# Patient Record
Sex: MALE | Race: WHITE
[De-identification: names, ages, dates, MRNs, and addresses within clinical notes are randomized per-mention and may not be internally consistent; named-entity substitution may affect disease eponyms.]

---

## 2017-03-18 ENCOUNTER — HOSPITAL ENCOUNTER (EMERGENCY)
Dept: HOSPITAL 59 - ER | Age: 72
Discharge: HOME | End: 2017-03-18
Payer: MEDICARE

## 2017-03-18 DIAGNOSIS — L03.012: Primary | ICD-10-CM

## 2017-03-18 PROCEDURE — 99283 EMERGENCY DEPT VISIT LOW MDM: CPT

## 2017-03-18 NOTE — EMERGENCY DEPARTMENT RECORD
History of Present Illness





- General


Chief complaint: Extremity Problem


Stated complaint: FINGER INJURY ON LT HAND


Time Seen by Provider: 17 17:47


Source: Patient


Mode of Arrival: Ambulatory


Limitations: No limitations





- History of Present Illness


Initial comments: 


pt has a small area on l middle finger that was like a pimple that is getting 

more sore with spreading redness


MD Complaint: Extremity pain, Extremity swelling


Onset/Timin


-: Days(s)


Location: Left, Hand


Severity scale (1-10): 4


Quality: Aching, Other


Consistency: Constant


Improves with: Nothing


Worsens with: Palpation


Associated Symptoms: Denies other symptoms





- Related Data


 Home Medications











 Medication  Instructions  Recorded  Confirmed  Last Taken


 


Aspirin [Aspirin EC] 81 mg PO DAILY 09/04/15 03/18/17 03/18/17


 


Atenolol 100 mg PO DAILY 09/04/15 03/18/17 03/18/17


 


Calcium Carbonate/Vitamin D3 1 each PO DAILY 09/04/15 03/18/17 03/18/17





[Caltrate 600 + D Tablet]    


 


Captopril [Capoten] 100 mg PO DAILY 09/04/15 03/18/17 03/18/17


 


Clonidine HCl 0.1 mg PO DAILY 09/04/15 03/18/17 03/18/17


 


Clopidogrel Bisulfate [Clopidogrel] 75 mg PO DAILY 09/04/15 03/18/17 03/18/17


 


Clotrimazole/Betamethasone Dip 45 gm TP ASDIR PRN 09/04/15 03/18/17 03/18/17





[Clotrimazole-Betamethasone Crm]    


 


Glyburide 5 mg PO BID 09/04/15 03/18/17 03/18/17


 


Insulin Detemir [Levemir Flextouch] 15 unit SQ QHS 09/04/15 03/18/17 03/18/17


 


Isosorbide Mononitrate [Imdur] 60 mg PO DAILY 09/04/15 03/18/17 03/18/17


 


Magnesium Oxide [Mag Ox] 400 mg PO DAILY 09/04/15 03/18/17 03/18/17


 


Metformin HCl [Metformin HCl ER] 1,000 mg PO BID 09/04/15 03/18/17 03/18/17


 


Multivit-Min/FA/Lycopene/Lut 1 each PO DAILY 09/04/15 03/18/17 03/18/17





[Centrum Silver Tablet]    


 


Needles, Insulin Disposable 1 each MC DAILY 09/04/15 03/18/17 03/18/17





[Novofine]    


 


Nitroglycerin [Nitrodur] 1 ea TD ASDIR PRN 09/04/15 03/18/17 Unknown


 


Omega-3/Dha/Epa/Fish Oil [Fish Oil] 500 mg PO DAILY 09/04/15 03/18/17 03/18/17


 


Penicillin V Potassium 500 mg PO DAILY 09/04/15 03/18/17 03/18/17


 


Potassium Chloride [Klor-Con] 10 meq PO BID 09/04/15 03/18/17 03/18/17


 


Pravastatin Sodium [Pravachol] 40 mg PO DAILY 09/04/15 03/18/17 03/18/17








 Previous Rx's











 Medication  Instructions  Recorded


 


Clindamycin HCl [Cleocin HCl] 300 mg PO QID #40 capsule 17











 Allergies











Allergy/AdvReac Type Severity Reaction Status Date / Time


 


No Known Drug Allergies Allergy   Verified 09/04/15 11:52














Travel Screening





- Travel/Exposure Within Last 30 Days


Have you traveled within the last 30 days?: No





- Travel/Exposure Within Last Year


Have you traveled outside the [place] in the last year?: No





- Additonal Travel Details


Have you been exposed to anyone with a communicable illness?: No





- Travel Symptoms


Symptom Screening: None





Review of Systems


Reviewed: No additional complaints except as noted below


Constitutional: Reports: As per HPI.  Denies: Chills, Fever, Malaise, Night 

sweats, Weakness, Weight change


Eyes: Reports: As per HPI.  Denies: Eye discharge, Eye pain, Photophobia, 

Vision change


ENT: Reports: As per HPI.  Denies: Congestion, Dental pain, Ear pain, Epistaxis

, Hearing loss, Throat pain


Respiratory: Reports: As per HPI.  Denies: Cough, Dyspnea, Hemoptysis, Stridor, 

Wheezes


Cardiovascular: Reports: As per HPI.  Denies: Arrhythmia, Chest pain, Dyspnea 

on exertion, Edema, Murmurs, Orthopnea, Palpitations, Paroxysmal nocturnal 

dyspnea, Rheumatic Fever, Syncope


Endocrine: Reports: As per HPI.  Denies: Fatigue, Heat or cold intolerance, 

Polydipsia, Polyuria


Gastrointestinal: Reports: As per HPI.  Denies: Abdominal pain, Constipation, 

Diarrhea, Hematemesis, Hematochezia, Melena, Nausea, Vomiting


Genitourinary: Reports: As per HPI.  Denies: Dysuria, Frequency, Hematuria, 

Incontinence, Retention, Testicular pain, Testicular mass, Urgency


Musculoskeletal: Reports: As per HPI.  Denies: Arthralgia, Back pain, Gout, 

Joint swelling, Myalgia, Neck pain


Skin: Reports: As per HPI.  Denies: Bruising, Change in color, Change in hair/

nails, Lesions, Pruritus, Rash


Neurological: Reports: As per HPI.  Denies: Abnormal gait, Confusion, Headache, 

Numbness, Paresthesias, Seizure, Tingling, Tremors, Vertigo, Weakness


Psychiatric: Reports: As per HPI.  Denies: Anxiety, Auditory hallucinations, 

Depression, Homicidal thoughts, Suicidal thoughts, Visual hallucinations


Hematological/Lymphatic: Reports: As per HPI.  Denies: Anemia, Blood Clots, 

Easy bleeding, Easy bruising, Swollen glands





Past Medical History





- SOCIAL HISTORY


Smoking Status: Former smoker


Alcohol Use: None


Drug Use: None





- RESPIRATORY


Hx Respiratory Disorders: No





- CARDIOVASCULAR


Hx Cardio Disorders: Yes


Hx Cardiac Cath: Yes


Hx Heart Attack: Yes


Hx Hypertension: Yes


Hx Coronary Artery Bypass Graft: Yes (x4)





- NEURO


Hx Neuro Disorders: No





- GI


Hx GI Disorders: Yes


Hx of Polyps: Yes





- 


Hx Genitourinary Disorders: Yes


Hx Bladder Problem: Yes (bladder infection -> cystoscopy)





- ENDOCRINE


Hx Endocrine Disorders: Yes





- MUSCULOSKELETAL


Hx Musculoskeletal Disorders: No





- PSYCH


Hx Psych Problems: No





- HEMATOLOGY/ONCOLOGY


Hx Hematology/Oncology Disorders: Yes


Hx Bruising: Yes (plavix)





Family Medical History


Any Significant Family History?: No


Hx Cancer: Mother


*Cancer Comment: lung


Hx Diabetes: Grandparents


Hx Heart Disease: Father


*Heart Comment: MI





Physical Exam





- General


General Appearance: Alert, Oriented x3, Cooperative, Mild distress





- Head


Head exam: Normal inspection





- Eye


Eye exam: Normal appearance, PERRL, EOMI


Pupils: Normal accommodation





- ENT


ENT exam: Normal exam, Mucous membranes moist, Normal external ear exam, Normal 

orophraynx


Ear exam: Normal external inspection.  negative: External canal tenderness


Nasal Exam: Normal inspection.  negative: Discharge, Sinus tenderness


Mouth exam: Normal external inspection, Tongue normal


Teeth exam: Normal inspection.  negative: Dental caries


Throat exam: Normal inspection.  negative: Tonsillar erythema, Tonsillar exudate





- Neck


Neck exam: Normal inspection, Full ROM.  negative: Tenderness





- Respiratory


Respiratory exam: Normal lung sounds bilaterally.  negative: Respiratory 

distress





- Cardiovascular


Cardiovascular Exam: Regular rate, Normal rhythm, Normal heart sounds





- GI/Abdominal


GI/Abdominal exam: Soft, Normal bowel sounds.  negative: Tenderness





- Rectal


Rectal exam: Deferred





- 


 exam: Deferred





- Extremities


Extremities exam: Normal inspection, Full ROM, Normal capillary refill, 

Tenderness, Other (erythema, mild swelling)


Image of Hand: 


  __________________________














  __________________________





 1 - swelling w small pustule, tender








- Back


Back exam: Reports: Normal inspection, Full ROM.  Denies: Muscle spasm, Rash 

noted, Tenderness





- Neurological


Neurological exam: Alert, CN II-XII intact, Normal gait, Oriented X3





- Psychiatric


Psychiatric exam: Normal affect, Normal mood





- Skin


Skin exam: Dry, Intact, Normal color, Warm





Course





 Vital Signs











  17





  17:25


 


Temperature 98.3 F


 


Pulse Rate 70


 


Respiratory 16





Rate 


 


Blood Pressure 186/92


 


Pulse Ox 98














Disposition


Disposition: Discharge


Clinical Impression: 


Cellulitis


Qualifiers:


 Site of cellulitis: extremity Site of cellulitis of extremity: finger 

Laterality: left Qualified Code(s): L03.012 - Cellulitis of left finger


Disposition: Home, Self-Care


Condition: (1) Good


Instructions:  Cellulitis (ED)


Additional Instructions: 


recheck in the morning.  return sooner if worse


Prescriptions: 


Clindamycin HCl [Cleocin HCl] 300 mg PO QID #40 capsule


Forms:  Patient Portal Access

## 2017-03-19 ENCOUNTER — HOSPITAL ENCOUNTER (EMERGENCY)
Dept: HOSPITAL 59 - ER | Age: 72
Discharge: HOME | End: 2017-03-19
Payer: MEDICARE

## 2017-03-19 DIAGNOSIS — L03.012: Primary | ICD-10-CM

## 2017-03-19 PROCEDURE — 99282 EMERGENCY DEPT VISIT SF MDM: CPT

## 2017-03-19 NOTE — EMERGENCY DEPARTMENT RECORD
History of Present Illness





- General


Chief Complaint: Wound, check


Stated Complaint: WOUND RECHECK


Time Seen by Provider: 17 07:07


Source: Patient


Mode of arrival: Ambulatory


Limitations: No limitations





- History of Present Illness


Initial Comments: 


72 yo male returns for repeat evaluation of redness and swelling to the left 

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  Patient reports mild increase in the swelling to the dorsum 

of the hand since being seen last night, reports redness has not spread 

however.  Patient was started on oral Clindamyin yesterday.  Patient denies 

fevers, chills, or weakness symptoms.  Patient does report history of Type I DM.


MD Complaint: Wound re-check


Onset/Timin


-: Days(s)


Initial Visit For: Cellulitis


Returns Today for: Wound recheck


Symptoms Since Prior Visit: Worsening swelling


Associated Symptoms: None





- Related Data


 Home Medications











 Medication  Instructions  Recorded  Confirmed  Last Taken


 


Aspirin [Aspirin EC] 81 mg PO DAILY 09/04/15 03/19/17 03/18/17


 


Atenolol 100 mg PO DAILY 09/04/15 03/19/17 03/18/17


 


Calcium Carbonate/Vitamin D3 1 each PO DAILY 09/04/15 03/19/17 03/18/17





[Caltrate 600 + D Tablet]    


 


Captopril [Capoten] 100 mg PO DAILY 09/04/15 03/19/17 03/18/17


 


Clonidine HCl 0.1 mg PO DAILY 09/04/15 03/19/17 03/18/17


 


Clopidogrel Bisulfate [Clopidogrel] 75 mg PO DAILY 09/04/15 03/19/17 03/18/17


 


Clotrimazole/Betamethasone Dip 45 gm TP ASDIR PRN 09/04/15 03/19/17 03/18/17





[Clotrimazole-Betamethasone Crm]    


 


Glyburide 5 mg PO BID 09/04/15 03/19/17 03/18/17


 


Insulin Detemir [Levemir Flextouch] 15 unit SQ QHS 09/04/15 03/19/17 03/18/17


 


Isosorbide Mononitrate [Imdur] 60 mg PO DAILY 09/04/15 03/19/17 03/18/17


 


Magnesium Oxide [Mag Ox] 400 mg PO DAILY 09/04/15 03/19/17 03/18/17


 


Metformin HCl [Metformin HCl ER] 1,000 mg PO BID 09/04/15 03/19/17 03/18/17


 


Multivit-Min/FA/Lycopene/Lut 1 each PO DAILY 09/04/15 03/19/17 03/18/17





[Centrum Silver Tablet]    


 


Needles, Insulin Disposable 1 each MC DAILY 09/04/15 03/19/17 03/18/17





[Novofine]    


 


Nitroglycerin [Nitrodur] 1 ea TD ASDIR PRN 09/04/15 03/19/17 03/18/17


 


Omega-3/Dha/Epa/Fish Oil [Fish Oil] 500 mg PO DAILY 09/04/15 03/19/17 03/18/17


 


Penicillin V Potassium 500 mg PO DAILY 09/04/15 03/19/17 03/18/17


 


Potassium Chloride [Klor-Con] 10 meq PO BID 09/04/15 03/19/17 03/18/17


 


Pravastatin Sodium [Pravachol] 40 mg PO DAILY 09/04/15 03/19/17 03/18/17








 Previous Rx's











 Medication  Instructions  Recorded


 


Clindamycin HCl [Cleocin HCl] 300 mg PO QID #40 capsule 17











 Allergies











Allergy/AdvReac Type Severity Reaction Status Date / Time


 


No Known Drug Allergies Allergy   Verified 09/04/15 11:52














Travel Screening





- Travel/Exposure Within Last 30 Days


Have you traveled within the last 30 days?: No





- Travel/Exposure Within Last Year


Have you traveled outside the [place] in the last year?: No





- Additonal Travel Details


Have you been exposed to anyone with a communicable illness?: No





- Travel Symptoms


Symptom Screening: None





Review of Systems


Constitutional: Denies: Chills, Fever, Malaise, Night sweats


Eyes: Denies: Eye discharge, Eye pain, Photophobia


ENT: Denies: Congestion, Ear pain, Epistaxis


Respiratory: Denies: Cough, Dyspnea, Hemoptysis


Cardiovascular: Denies: Chest pain, Dyspnea on exertion


Endocrine: Denies: Fatigue, Heat or cold intolerance


Gastrointestinal: Denies: Abdominal pain, Nausea, Vomiting


Genitourinary: Denies: Incontinence, Retention


Musculoskeletal: Denies: Arthralgia, Back pain


Skin: Reports: Rash.  Denies: Bruising, Change in color


Neurological: Denies: Abnormal gait, Confusion, Headache


Psychiatric: Denies: Anxiety


Hematological/Lymphatic: Denies: Anemia, Blood Clots





Past Medical History





- SOCIAL HISTORY


Smoking Status: Former smoker


Alcohol Use: None


Drug Use: None





- RESPIRATORY


Hx Respiratory Disorders: No





- CARDIOVASCULAR


Hx Cardio Disorders: Yes


Hx Cardiac Cath: Yes


Hx Heart Attack: Yes


Hx Hypertension: Yes


Hx Coronary Artery Bypass Graft: Yes (x4)





- NEURO


Hx Neuro Disorders: No





- GI


Hx GI Disorders: Yes


Hx of Polyps: Yes





- 


Hx Genitourinary Disorders: Yes


Hx Bladder Problem: Yes (bladder infection -> cystoscopy)





- ENDOCRINE


Hx Endocrine Disorders: Yes





- MUSCULOSKELETAL


Hx Musculoskeletal Disorders: No





- PSYCH


Hx Psych Problems: No





- HEMATOLOGY/ONCOLOGY


Hx Hematology/Oncology Disorders: Yes


Hx Bruising: Yes (plavix)





Family Medical History


Any Significant Family History?: No


Hx Cancer: Mother


*Cancer Comment: lung


Hx Diabetes: Grandparents


Hx Heart Disease: Father


*Heart Comment: MI





Physical Exam





- General


General Appearance: Alert, Oriented x3, Cooperative, No acute distress


Limitations: No limitations





- Head


Head exam: Atraumatic, Normocephalic, Normal inspection


Head exam detail: negative: Abrasion, Contusion, Hastings's sign, General 

tenderness, Hematoma, Laceration





- Eye


Eye exam: Normal appearance.  negative: Conjunctival injection, Periorbital 

swelling, Periorbital tenderness, Scleral icterus





- ENT


Ear exam: negative: Auricular hematoma, Auricular trauma


Nasal Exam: negative: Active bleeding, Discharge, Dried blood


Mouth exam: negative: Drooling, Laceration, Muffled voice, Tongue elevation





- Neck


Neck exam: Normal inspection.  negative: Meningismus, Tenderness





- Respiratory


Respiratory exam: Normal lung sounds bilaterally.  negative: Respiratory 

distress, Rhonchi, Stridor, Wheezes





- Cardiovascular


Cardiovascular Exam: Regular rate, Normal rhythm, Normal heart sounds


Peripheral Pulses: 3+: Radial (L)





- GI/Abdominal


GI/Abdominal exam: Soft.  negative: Rebound, Rigid, Tenderness





- Rectal


Rectal exam: Deferred





- 


 exam: Deferred





- Extremities


Extremities exam: Tenderness, Other (Mild STS and erythema to the left middle 

digit with a small lesion draining clear fluid present, STS extends to the mid-

dorsum of the left hand.  No evidence for tenosynovitis to the finger on 

examination, and compartments are soft on examination.  FROM of all digits on 

examination as well.).  negative: Calf tenderness, Pedal edema





- Back


Back exam: Denies: CVA tenderness (R), CVA tenderness (L)





- Neurological


Neurological exam: Alert, Normal gait, Oriented X3





- Psychiatric


Psychiatric exam: Normal affect, Normal mood





- Skin


Skin exam: Erythema.  negative: Abrasion


Type of lesion: negative: abrasion





Course





 Vital Signs











  17





  07:02


 


Temperature 97.9 F


 


Pulse Rate 73


 


Respiratory 16





Rate 


 


Pulse Ox 96














- Reevaluation(s)


Reevaluation #1: 





17 07:21


Patient seen and examined, reports STS is mildly worsened, however erythema has 

not traveled more proximally or spread. Patient has no signs of tenosynovitis 

on examination.  Patient appears stable for discharge home with instructions to 

continue clindamycin 4 times daily with a instructions to return to ED in 24 

hours for repeat evaluation.  Patient agrees with the plan as discussed.





Disposition


Disposition: Discharge


Clinical Impression: 


Cellulitis


Qualifiers:


 Site of cellulitis: extremity Site of cellulitis of extremity: upper extremity 

Laterality: left Qualified Code(s): L03.114 - Cellulitis of left upper limb


Disposition: Home, Self-Care


Condition: (2) Stable


Instructions:  Cellulitis (ED)


Additional Instructions: 


Return to ED in 24 hours for a recheck on your hand infection, sooner if 

symptoms worsen.


Clindamycin as directed.  


Follow-up with your family doctor in 1-3 days as directed.


Forms:  Patient Portal Access


Time of Disposition: 07:15

## 2017-03-20 ENCOUNTER — HOSPITAL ENCOUNTER (EMERGENCY)
Dept: HOSPITAL 59 - ER | Age: 72
Discharge: HOME | End: 2017-03-20
Payer: MEDICARE

## 2017-03-20 DIAGNOSIS — L03.012: Primary | ICD-10-CM

## 2017-03-20 PROCEDURE — 99282 EMERGENCY DEPT VISIT SF MDM: CPT

## 2017-03-20 NOTE — EMERGENCY DEPARTMENT RECORD
History of Present Illness





- General


Chief Complaint: Recheck - Other


Stated Complaint: RECHECK HAND


Time Seen by Provider: 03/20/17 08:11


Source: Patient


Mode of arrival: Ambulatory


Limitations: No limitations





- History of Present Illness


Initial Comments: 


72 yo male returns for repeat evaluation of an infection involving his left 

hand.  Patient reports that his symptoms began as a "pimple" to the left middle 

digit, had spread to the dorsum of his left hand with symptoms of swelling and 

redness.  Patient has been taking Clindamycin for his symptoms, denies any 

worsening of his symptoms since his evaluation 24 hours ago, denies fevers, 

chills, or other systemic symptoms.


MD Complaint: Wound re-check


Onset/Timing: 3


-: Days(s)


Initial Visit For: Cellulitis


Returns Today for: Cellulitis follow-up


Symptoms Since Prior Visit: No new symptoms


Associated Symptoms: None


Treatments Prior to Arrival: Given antibiotics on initial visit





- Related Data


 Home Medications











 Medication  Instructions  Recorded  Confirmed  Last Taken


 


Aspirin [Aspirin EC] 81 mg PO DAILY 09/04/15 03/19/17 03/18/17


 


Atenolol 100 mg PO DAILY 09/04/15 03/19/17 03/18/17


 


Calcium Carbonate/Vitamin D3 1 each PO DAILY 09/04/15 03/19/17 03/18/17





[Caltrate 600 + D Tablet]    


 


Captopril [Capoten] 100 mg PO DAILY 09/04/15 03/19/17 03/18/17


 


Clonidine HCl 0.1 mg PO DAILY 09/04/15 03/19/17 03/18/17


 


Clopidogrel Bisulfate [Clopidogrel] 75 mg PO DAILY 09/04/15 03/19/17 03/18/17


 


Clotrimazole/Betamethasone Dip 45 gm TP ASDIR PRN 09/04/15 03/19/17 03/18/17





[Clotrimazole-Betamethasone Crm]    


 


Glyburide 5 mg PO BID 09/04/15 03/19/17 03/18/17


 


Insulin Detemir [Levemir Flextouch] 15 unit SQ QHS 09/04/15 03/19/17 03/18/17


 


Isosorbide Mononitrate [Imdur] 60 mg PO DAILY 09/04/15 03/19/17 03/18/17


 


Magnesium Oxide [Mag Ox] 400 mg PO DAILY 09/04/15 03/19/17 03/18/17


 


Metformin HCl [Metformin HCl ER] 1,000 mg PO BID 09/04/15 03/19/17 03/18/17


 


Multivit-Min/FA/Lycopene/Lut 1 each PO DAILY 09/04/15 03/19/17 03/18/17





[Centrum Silver Tablet]    


 


Needles, Insulin Disposable 1 each MC DAILY 09/04/15 03/19/17 03/18/17





[Novofine]    


 


Nitroglycerin [Nitrodur] 1 ea TD ASDIR PRN 09/04/15 03/19/17 03/18/17


 


Omega-3/Dha/Epa/Fish Oil [Fish Oil] 500 mg PO DAILY 09/04/15 03/19/17 03/18/17


 


Penicillin V Potassium 500 mg PO DAILY 09/04/15 03/19/17 03/18/17


 


Potassium Chloride [Klor-Con] 10 meq PO BID 09/04/15 03/19/17 03/18/17


 


Pravastatin Sodium [Pravachol] 40 mg PO DAILY 09/04/15 03/19/17 03/18/17








 Previous Rx's











 Medication  Instructions  Recorded


 


Clindamycin HCl [Cleocin HCl] 300 mg PO QID #40 capsule 03/18/17











 Allergies











Allergy/AdvReac Type Severity Reaction Status Date / Time


 


No Known Drug Allergies Allergy   Verified 09/04/15 11:52














Review of Systems


Constitutional: Denies: Chills, Fever, Malaise, Night sweats, Weakness


Eyes: Denies: Eye discharge, Eye pain


ENT: Denies: Congestion, Ear pain


Respiratory: Denies: Cough, Dyspnea


Cardiovascular: Denies: Chest pain, Dyspnea on exertion


Endocrine: Denies: Fatigue, Heat or cold intolerance


Gastrointestinal: Denies: Nausea, Vomiting


Genitourinary: Denies: Incontinence, Retention


Musculoskeletal: Denies: Arthralgia, Back pain, Gout, Joint swelling


Skin: Reports: Rash.  Denies: Bruising, Change in color


Neurological: Denies: Abnormal gait, Confusion, Headache, Seizure


Psychiatric: Denies: Anxiety


Hematological/Lymphatic: Denies: Anemia, Blood Clots





Past Medical History





- SOCIAL HISTORY


Smoking Status: Former smoker


Drug Use: None





- RESPIRATORY


Hx Respiratory Disorders: No





- CARDIOVASCULAR


Hx Cardio Disorders: Yes


Hx Cardiac Cath: Yes


Hx Heart Attack: Yes


Hx Hypertension: Yes


Hx Coronary Artery Bypass Graft: Yes (x4)





- NEURO


Hx Neuro Disorders: No





- GI


Hx GI Disorders: Yes


Hx of Polyps: Yes





- 


Hx Genitourinary Disorders: Yes


Hx Bladder Problem: Yes (bladder infection -> cystoscopy)





- ENDOCRINE


Hx Endocrine Disorders: Yes





- MUSCULOSKELETAL


Hx Musculoskeletal Disorders: No





- PSYCH


Hx Psych Problems: No





- HEMATOLOGY/ONCOLOGY


Hx Hematology/Oncology Disorders: Yes


Hx Bruising: Yes (plavix)





Family Medical History


Hx Cancer: Mother


*Cancer Comment: lung


Hx Diabetes: Grandparents


Hx Heart Disease: Father


*Heart Comment: MI





Physical Exam





- General


General Appearance: Alert, Oriented x3, Cooperative, No acute distress


Limitations: No limitations





- Head


Head exam: Atraumatic, Normocephalic, Normal inspection


Head exam detail: negative: Abrasion, Contusion, Hastings's sign, General 

tenderness, Hematoma, Laceration





- Eye


Eye exam: Normal appearance.  negative: Conjunctival injection, Periorbital 

swelling, Periorbital tenderness, Scleral icterus





- ENT


Ear exam: negative: Auricular hematoma, Auricular trauma


Nasal Exam: negative: Active bleeding, Discharge, Dried blood, Foreign body


Mouth exam: negative: Drooling, Laceration, Muffled voice, Tongue elevation





- Neck


Neck exam: Normal inspection.  negative: Meningismus, Tenderness





- Respiratory


Respiratory exam: Normal lung sounds bilaterally.  negative: Rales, Respiratory 

distress, Rhonchi, Stridor





- Cardiovascular


Cardiovascular Exam: Regular rate, Normal rhythm, Normal heart sounds





- GI/Abdominal


GI/Abdominal exam: Soft.  negative: Rebound, Rigid, Tenderness





- Rectal


Rectal exam: Deferred





- 


 exam: Deferred





- Extremities


Extremities exam: Tenderness, Other (Improved erythema and STS to the dorsum of 

the left hand, small focal area of erythema to the middle digit without abscess 

present.  No evidence for tenosynovitis on examination, FROM present.  

Compartments are soft on examination.).  negative: Calf tenderness, Pedal edema





- Back


Back exam: Denies: CVA tenderness (R), CVA tenderness (L)





- Neurological


Neurological exam: Alert, Normal gait, Oriented X3





- Psychiatric


Psychiatric exam: Normal affect, Normal mood





- Skin


Skin exam: Erythema, Intact, Warm


Type of lesion: Other (cellulutis)


Distribution of rash: LUE


Description of rash: Confluent





Course





- Reevaluation(s)


Reevaluation #1: 





03/20/17 08:16


Symptoms appears mildly improved from examination 24 hours prior, no evidence 

for tenosynovitis on examination.  Patient appears stable for discharge with 

continued outpatient treatment with return for any worsening of his symptoms 

and instructions to follow-up with his PCP in 3-5 days as directed.





Disposition


Disposition: Discharge


Clinical Impression: 


Cellulitis


Qualifiers:


 Site of cellulitis: extremity Site of cellulitis of extremity: upper extremity 

Laterality: left Qualified Code(s): L03.114 - Cellulitis of left upper limb


Instructions:  Cellulitis (ED)


Additional Instructions: 


Return to ED if your symptoms worsen or if you have any concerns.


Follow-up with your family doctor in 3-5 days as directed.


Continue Clindamycin as prescribed.


Forms:  Patient Portal Access


Time of Disposition: 08:12

## 2017-09-16 ENCOUNTER — HOSPITAL ENCOUNTER (EMERGENCY)
Dept: HOSPITAL 59 - ER | Age: 72
Discharge: HOME | End: 2017-09-16
Payer: MEDICARE

## 2017-09-16 DIAGNOSIS — M79.661: ICD-10-CM

## 2017-09-16 DIAGNOSIS — L03.115: Primary | ICD-10-CM

## 2017-09-16 PROCEDURE — 99282 EMERGENCY DEPT VISIT SF MDM: CPT

## 2017-09-16 NOTE — EMERGENCY DEPARTMENT RECORD
History of Present Illness





- General


Chief complaint: Lower Extremity Pain


Stated complaint: RIGHT LEG PAIN


Time Seen by Provider: 17 20:11


Source: Patient


Mode of Arrival: Ambulatory


Limitations: No limitations





- History of Present Illness


Initial comments: 





72 yo male presents with an injury to the right leg 4 days ago.  He backed into 

a picnic table.  He had a bruise/hematoma to the posterior lower leg.  He has a 

history of prior cellulitis.  He noted some leg redness and warmth.  No 

definite fevers.  No other cough, shortness or breath, chest pain, nausea, 

vomiting, diarrhea, dysuria.  No numbness, weakness, or changes in function.  

He has a small superfical blood blister on the posterior leg.  No drainage.  He 

is on Plavix and prone to bruising.  


MD Complaint: Extremity pain


Onset/Timin


-: Days(s)


Location: Right, Lower Leg


History of Same: Yes


Radiation: None


Severity scale (1-10): 6


Quality: Aching


Consistency: Constant


Improves with: Cold therapy, Elevation


Worsens with: Walking, Weight bearing


Associated Symptoms: Fever





- Related Data


 Previous Rx's











 Medication  Instructions  Recorded


 


Cephalexin [Keflex] 500 mg PO QID #40 cap 17











 Allergies











Allergy/AdvReac Type Severity Reaction Status Date / Time


 


No Known Drug Allergies Allergy   Unverified 17 12:48














Travel Screening





- Travel/Exposure Within Last 30 Days


Have you traveled within the last 30 days?: No





- Travel/Exposure Within Last Year


Have you traveled outside the [place] in the last year?: No





- Additonal Travel Details


Have you been exposed to anyone with a communicable illness?: No





- Travel Symptoms


Symptom Screening: None





Review of Systems


Constitutional: Denies: Chills, Fever, Malaise, Weakness


Eyes: Denies: Eye discharge


ENT: Denies: Congestion, Throat pain


Respiratory: Denies: Cough, Dyspnea, Hemoptysis


Cardiovascular: Denies: Chest pain, Palpitations, Syncope


Endocrine: Denies: Fatigue, Polydipsia, Polyuria


Gastrointestinal: Denies: Abdominal pain, Diarrhea, Nausea, Vomiting


Genitourinary: Denies: Dysuria, Frequency, Hematuria


Musculoskeletal: Reports: As per HPI, Myalgia.  Denies: Arthralgia, Back pain, 

Neck pain


Skin: Reports: As per HPI, Bruising, Change in color


Neurological: Denies: Headache, Numbness, Weakness


Psychiatric: Denies: Anxiety


Hematological/Lymphatic: Denies: Blood Clots, Easy bleeding, Easy bruising, 

Swollen glands





Past Medical History





- SOCIAL HISTORY


Smoking Status: Former smoker


Alcohol Use: Occasional


Drug Use: None





- RESPIRATORY


Hx Respiratory Disorders: No





- CARDIOVASCULAR


Hx Cardio Disorders: Yes


Hx Cardiac Cath: Yes


Hx Heart Attack: Yes


Hx Hypertension: Yes


Hx Coronary Artery Bypass Graft: Yes (x4)





- NEURO


Hx Neuro Disorders: No





- GI


Hx GI Disorders: Yes


Hx of Polyps: Yes





- 


Hx Genitourinary Disorders: Yes


Hx Bladder Problem: Yes (bladder infection -> cystoscopy)





- ENDOCRINE


Hx Endocrine Disorders: Yes





- MUSCULOSKELETAL


Hx Musculoskeletal Disorders: No





- PSYCH


Hx Psych Problems: No





- HEMATOLOGY/ONCOLOGY


Hx Hematology/Oncology Disorders: Yes


Hx Bruising: Yes (plavix)





Family Medical History


Any Significant Family History?: No


Hx Cancer: Mother


*Cancer Comment: lung


Hx Diabetes: Grandparents


Hx Heart Disease: Father


*Heart Comment: MI





Physical Exam





- General


General Appearance: Alert, Oriented x3, Cooperative, No acute distress


Limitations: No limitations





- Head


Head exam: Atraumatic, Normocephalic, Normal inspection





- Eye


Eye exam: Normal appearance.  negative: Conjunctival injection, Periorbital 

swelling





- ENT


ENT exam: Normal exam


Ear exam: Normal external inspection


Nasal Exam: Normal inspection





- Neck


Neck exam: Normal inspection, Full ROM.  negative: Tenderness





- Respiratory


Respiratory exam: Normal lung sounds bilaterally.  negative: Respiratory 

distress





- Cardiovascular


Cardiovascular Exam: Regular rate, Normal rhythm, Normal heart sounds


Peripheral Pulses: 2+: Radial (L)





- Rectal


Rectal exam: Deferred





- 


 exam: Deferred





- Extremities


Extremities exam: Full ROM, Normal capillary refill, Pedal edema (mild), 

Tenderness (mild at the bruising site).  negative: Normal inspection, Calf 

tenderness, Joint swelling


Image of Full Body: 


  __________________________














  __________________________





 1 - Bruising to the proximal calf with quarter size superfical blister, mild 

calf swelling with warmth and mild erythema, no pain with foot flexion, 

negative Carolina's sign, intact brisk pulses, intact sensation








- Back


Back exam: Denies: CVA tenderness (R), CVA tenderness (L)





- Neurological


Neurological exam: Alert, Oriented X3





- Psychiatric


Psychiatric exam: negative: Agitated, Anxious





- Skin


Skin exam: Other (Bruising and erythema)





Course





 Vital Signs











  17





  20:01


 


Temperature 99.7 F H


 


Pulse Rate 77


 


Respiratory 20





Rate 


 


Blood Pressure 155/82


 


Pulse Ox 100














- Reevaluation(s)


Reevaluation #1: 


The examination is consistent with an early mild cellulitis, no signs of DVT, 

he has bruising and mild swelling likely from being on Plavix and aspirin, no 

signs of compartment syndrome, 





The patient was given Keflex in the ED


He has follow at Tuesday.


He will return if there is any worsening or new concerns.


17 20:25





17 20:29








Disposition


Disposition: Discharge


Clinical Impression: 


Cellulitis


Qualifiers:


 Site of cellulitis: extremity Site of cellulitis of extremity: lower extremity 

Laterality: right Qualified Code(s): L03.115 - Cellulitis of right lower limb





Disposition: Home, Self-Care


Condition: (1) Good


Instructions:  Cellulitis (ED)


Additional Instructions: 


Return to the ER if you have fever, increased redness, warmth, swelling or any 

other new symptoms or concerns


Follow up on Tuesday as scheduled


Take the Keflex every 6 hours


Prescriptions: 


Cephalexin [Keflex] 500 mg PO QID #40 cap


Forms:  Patient Portal Access


Time of Disposition: 20:26





Quality





- Quality Measures


Quality Measures: N/A





- Blood Pressure Screening


Does Patient Have Any of the Following: No


Blood Pressure Classification: Pre-Hypertensive BP Reading


Systolic Measurement: 155


Diastolic Measurement: 82


Screening for High Blood Pressure: < Pre-Hypertensive BP, F/U Documented > [

]


Pre-Hypertensive Follow-up Interventions: Referral to alternative/primary care 

provider.

## 2017-09-21 ENCOUNTER — HOSPITAL ENCOUNTER (EMERGENCY)
Dept: HOSPITAL 59 - ER | Age: 72
LOS: 1 days | Discharge: ANOTHER HOSPITAL | End: 2017-09-22
Payer: MEDICARE

## 2017-09-21 DIAGNOSIS — I10: ICD-10-CM

## 2017-09-21 DIAGNOSIS — R06.00: ICD-10-CM

## 2017-09-21 DIAGNOSIS — I50.9: ICD-10-CM

## 2017-09-21 DIAGNOSIS — J18.1: Primary | ICD-10-CM

## 2017-09-21 DIAGNOSIS — L03.115: ICD-10-CM

## 2017-09-21 DIAGNOSIS — I20.0: ICD-10-CM

## 2017-09-21 DIAGNOSIS — I25.2: ICD-10-CM

## 2017-09-21 DIAGNOSIS — Z87.891: ICD-10-CM

## 2017-09-21 LAB
ALBUMIN SERPL-MCNC: 3.4 G/DL (ref 4–5)
ALBUMIN/GLOB SERPL: 1.2 {RATIO} (ref 1.1–1.8)
ALP SERPL-CCNC: 115 U/L (ref 40–129)
ALT SERPL-CCNC: 35 U/L (ref ?–41)
ANION GAP SERPL CALC-SCNC: 13 MMOL/L (ref 7–16)
AST SERPL-CCNC: 58 U/L (ref 10–50)
BASOPHILS NFR BLD: 0.2 % (ref 0–6)
BILIRUB SERPL-MCNC: 0.4 MG/DL (ref 0.2–1)
BUN SERPL-MCNC: 80.8 MG/DL (ref 17.4–49.2)
CO2 SERPL-SCNC: 22 MMOL/L (ref 22–29)
CREAT SERPL-MCNC: 2 MG/DL (ref 0.7–1.2)
EOSINOPHIL NFR BLD: 0.7 % (ref 0–6)
ERYTHROCYTE [DISTWIDTH] IN BLOOD BY AUTOMATED COUNT: 17.6 % (ref 11.5–14.5)
EST GLOMERULAR FILTRATION RATE: 35 ML/MIN
GLOBULIN SER-MCNC: 2.8 GM/DL (ref 1.4–4.8)
GLUCOSE SERPL-MCNC: 358 MG/DL (ref 74–109)
GRANULOCYTES NFR BLD: 50.1 % (ref 47–80)
HCT VFR BLD CALC: 34.5 % (ref 42–52)
HGB BLD-MCNC: 11 GM/DL (ref 14–18)
LYMPHOCYTES NFR BLD AUTO: 42.7 % (ref 16–45)
MCH RBC QN AUTO: 26.1 PG (ref 27–33)
MCHC RBC AUTO-ENTMCNC: 31.9 G/DL (ref 32–36)
MCV RBC AUTO: 81.9 FL (ref 81–97)
MONOCYTES NFR BLD: 6.3 % (ref 0–9)
PLATELET # BLD: 171 K/UL (ref 130–400)
PMV BLD AUTO: 12.1 FL (ref 7.4–10.4)
PROT SERPL-MCNC: 6.2 G/DL (ref 6.6–8.7)
RBC # BLD AUTO: 4.21 M/UL (ref 4.4–5.7)
WBC # BLD AUTO: 16.1 K/UL (ref 4.2–12.2)

## 2017-09-21 PROCEDURE — 94640 AIRWAY INHALATION TREATMENT: CPT

## 2017-09-21 PROCEDURE — 99285 EMERGENCY DEPT VISIT HI MDM: CPT

## 2017-09-21 PROCEDURE — 85025 COMPLETE CBC W/AUTO DIFF WBC: CPT

## 2017-09-21 PROCEDURE — 96375 TX/PRO/DX INJ NEW DRUG ADDON: CPT

## 2017-09-21 PROCEDURE — 83880 ASSAY OF NATRIURETIC PEPTIDE: CPT

## 2017-09-21 PROCEDURE — 84484 ASSAY OF TROPONIN QUANT: CPT

## 2017-09-21 PROCEDURE — 93005 ELECTROCARDIOGRAM TRACING: CPT

## 2017-09-21 PROCEDURE — 96374 THER/PROPH/DIAG INJ IV PUSH: CPT

## 2017-09-21 PROCEDURE — 71020: CPT

## 2017-09-21 PROCEDURE — 93010 ELECTROCARDIOGRAM REPORT: CPT

## 2017-09-21 PROCEDURE — 80053 COMPREHEN METABOLIC PANEL: CPT

## 2017-09-21 NOTE — EMERGENCY DEPARTMENT RECORD
History of Present Illness





- General


Chief Complaint: Cough


Stated Complaint: COUGH AND JOSSE


Time Seen by Provider: 09/21/17 23:06


Source: Patient


Mode of Arrival: Ambulatory


Limitations: No limitations





- History of Present Illness


Initial Comments: 





70 yo male presents to ED with a CC of cough, difficulty in breathing symptoms 

tonight.  Patient reports progressively worsening congestion symptoms for the 

past several days.  Patient repoerts recent fever, non-productive cough 

symptoms.  Patient also reports that he is currently being treated for 

cellulitis of the RLE (has had numerous episodes of similar cellulitis 

previously) on Keflex and Bactrim currently.  Patient also reports several 

previous heart attacks, s/p CABG.  Patient denies previous lung problems.


MD Complaint: Cough, Fever, Other (JOSSE)


Onset/Timing: 3


-: Hour(s)


Severity: Moderate


Consistency: Constant, Getting worse


Improves With: Nothing


Worsens With: Activity


Associated Symptoms: Cough, Fever, Shortness of breath


Treatments Prior to Arrival: None





- Related Data


 Previous Rx's











 Medication  Instructions  Recorded


 


Cephalexin [Keflex] 500 mg PO QID #40 cap 09/16/17











 Allergies











Allergy/AdvReac Type Severity Reaction Status Date / Time


 


No Known Drug Allergies Allergy   Unverified 06/07/17 12:48














Travel Screening





- Travel/Exposure Within Last 30 Days


Have you traveled within the last 30 days?: No





Review of Systems


Constitutional: Reports: Fever, Malaise.  Denies: Chills, Night sweats


Eyes: Denies: Eye discharge, Eye pain


ENT: Reports: Congestion.  Denies: Ear pain, Epistaxis


Respiratory: Reports: Cough, Dyspnea, Wheezes


Cardiovascular: Reports: Dyspnea on exertion.  Denies: Chest pain, Edema


Endocrine: Denies: Fatigue, Heat or cold intolerance


Gastrointestinal: Denies: Abdominal pain, Nausea, Vomiting


Genitourinary: Denies: Incontinence, Retention


Musculoskeletal: Denies: Arthralgia, Back pain, Gout, Joint swelling


Skin: Denies: Bruising, Change in color


Neurological: Denies: Abnormal gait, Confusion, Headache, Seizure


Psychiatric: Denies: Anxiety


Hematological/Lymphatic: Denies: Anemia, Blood Clots





Past Medical History





- SOCIAL HISTORY


Smoking Status: Former smoker


Alcohol Use: None


Drug Use: None





- RESPIRATORY


Hx Respiratory Disorders: No





- CARDIOVASCULAR


Hx Cardio Disorders: Yes


Hx Cardiac Cath: Yes


Hx Heart Attack: Yes


Hx Hypertension: Yes


Hx Coronary Artery Bypass Graft: Yes (x4)





- NEURO


Hx Neuro Disorders: No





- GI


Hx GI Disorders: Yes


Hx of Polyps: Yes





- 


Hx Genitourinary Disorders: Yes


Hx Bladder Problem: Yes (bladder infection -> cystoscopy)





- ENDOCRINE


Hx Endocrine Disorders: Yes





- MUSCULOSKELETAL


Hx Musculoskeletal Disorders: No





- PSYCH


Hx Psych Problems: No





- HEMATOLOGY/ONCOLOGY


Hx Hematology/Oncology Disorders: Yes


Hx Bruising: Yes (plavix)





Family Medical History


Any Significant Family History?: Yes


Hx Cancer: Mother


*Cancer Comment: lung


Hx Diabetes: Grandparents


Hx Heart Disease: Father


*Heart Comment: MI





Physical Exam





- General


General Appearance: Alert, Oriented x3, Cooperative, Moderate distress


Limitations: No limitations





- Head


Head exam: Atraumatic, Normocephalic, Normal inspection


Head exam detail: negative: Abrasion, Contusion, Hastings's sign, General 

tenderness, Hematoma, Laceration





- Eye


Eye exam: Normal appearance.  negative: Conjunctival injection, Periorbital 

swelling, Periorbital tenderness, Scleral icterus





- ENT


Ear exam: negative: Auricular hematoma, Auricular trauma


Nasal Exam: negative: Active bleeding, Discharge, Dried blood, Foreign body


Mouth exam: negative: Drooling, Laceration, Muffled voice, Tongue elevation





- Neck


Neck exam: Normal inspection.  negative: Meningismus, Tenderness





- Respiratory


Respiratory exam: Decreased breath sounds, Respiratory distress.  negative: 

Rales, Rhonchi, Stridor





- Cardiovascular


Cardiovascular Exam: Regular rate, Normal rhythm, Normal heart sounds





- GI/Abdominal


GI/Abdominal exam: Soft.  negative: Rebound, Rigid, Tenderness





- Rectal


Rectal exam: Deferred





- 


 exam: Deferred





- Extremities


Extremities exam: Calf tenderness, Pedal edema, Tenderness, Other (TTP, 

erythema to the RLE c/w cellulitis, denies previous DVT history)





- Back


Back exam: Denies: CVA tenderness (R), CVA tenderness (L)





- Neurological


Neurological exam: Alert, Normal gait, Oriented X3





- Psychiatric


Psychiatric exam: Normal affect, Normal mood





- Skin


Skin exam: Erythema (RLE).  negative: Abrasion


Type of lesion: negative: abrasion





Course





 Vital Signs











  09/21/17





  23:03


 


Temperature 98.9 F


 


Pulse Rate [ 87





Pulse Ox Probe] 


 


Respiratory 22





Rate 


 


Blood Pressure 163/94





[Left Arm] 


 


Pulse Ox 95














- Reevaluation(s)


Reevaluation #1: 





09/21/17 23:28


EKG: NSR 86


LAD, IVCD


T Wave inversions I, AVL, ST depression V3-V6


No significant change from 8/22/2014 09/22/17 00:53





Reevaluation #2: 





09/21/17 23:55


CXR: RLL infiltrate


Zithromax and Rocephin ordered to infuse.


Labs pending at this time.


Reevaluation #3: 





09/22/17 00:06


Labs reviewed, WBC 16.1, BUN 80.8/Creatinine 2.0 (at baseline), Glucose 358, 

BNP 9982.  Will admit for further evaluation.


Reevaluation #4: 





09/22/17 00:16


Patient developing worsening chest pain symptoms, ASA and Nitro SL ordered, 

repeat EKG obtained:


EKG#2


NSR 87


LAD, IVCD


ST depression V3-V6 worsened from initial EKG





[facility]-call contacted for Cardiology consultation and likely transfer.


09/22/17 01:16





Reevaluation #5: 





09/22/17 00:35


Patient's CP symptoms resolved with Nitro x 2, Heparin bolus ordered.





09/22/17 00:42


Case was discussed with Dr. Penny as well as Dr. Dillon, Dr. Pneny 

will accept transfer.





09/22/17 00:52


3rd EKG (currently CP-free)


NSR 86


LAD, IVCD


ST depression V4-V6 improved from previous





09/22/17 01:15


[facility] is present for transfer, patient resting CP-free.





Medical Decision Making





- Lab Data


Result diagrams: 


 09/21/17 23:20





 09/21/17 23:20





Critical Care Time


Critical Care Time: Yes


Total Critical Care Time: 45


Critical Care Time: 





Diagnosis and treatment of hypoxia, CAP, Unstable angina, serial EKGs and 

monitoring, initiation of transfer for cardiology consultation





Disposition


Disposition: Transfer


Clinical Impression: 


 Unstable angina





CAP (community acquired pneumonia)


Qualifiers:


 Laterality: right Lung location: lower lobe of lung Qualified Code(s): J18.1 - 

Lobar pneumonia, unspecified organism





CHF (congestive heart failure)


Qualifiers:


 Congestive heart failure type: unspecified congestive heart failure type 

Congestive heart failure chronicity: unspecified congestive heart failure 

chronicity Qualified Code(s): I50.9 - Heart failure, unspecified





Chest pain


Qualifiers:


 Chest pain type: unspecified Qualified Code(s): R07.9 - Chest pain, unspecified





Disposition: Acute Care Hospital Transfer


Transfer To: [facility]


Reason For Transfer: CAP, Cardiology consultation


Accepting Physician: Wilma Penny


Time Discussed w/Accepting Physician: 00:43


Condition: (2) Stable


Forms:  Patient Portal Access


Time of Disposition: 00:43





Quality





- Quality Measures


Quality Measures: N/A





- Blood Pressure Screening


Does Patient Have Any of the Following: Active Dx of HTN


Blood Pressure Classification: Pre-Hypertensive BP Reading


Systolic Measurement: 143


Diastolic Measurement: 81


Screening for High Blood Pressure: Patient Exclusion, Hx of HTN []

## 2017-09-22 LAB — TROPONIN I SERPL-MCNC: < 0.3 NG/ML (ref 0–0.3)

## 2017-09-22 RX ADMIN — NITROGLYCERIN PRN MG: 0.4 TABLET SUBLINGUAL at 00:24

## 2017-09-22 RX ADMIN — NITROGLYCERIN PRN MG: 0.4 TABLET SUBLINGUAL at 00:19

## 2017-09-22 NOTE — RADIOLOGY REPORT
EXAM:  CHEST, TWO VIEWS 



HISTORY:  DIFFICULTY IN BREATHING.  



TECHNIQUE:  Frontal and lateral views of the chest were performed.  



FINDINGS:  The heart size is normal.  There is mild pulmonary vascular 
congestion.  There are small bilateral pleural effusions.  



IMPRESSION:  

CARDIOMEGALY WITH MILD CONGESTIN AND SMALL PLEURAL EFFUSIONS.  



JOB NUMBER:  [id number]
[facility]

## 2018-05-31 ENCOUNTER — HOSPITAL ENCOUNTER (OUTPATIENT)
Dept: HOSPITAL 59 - HOP | Age: 73
Discharge: HOME | End: 2018-05-31
Attending: INTERNAL MEDICINE
Payer: MEDICARE

## 2018-05-31 DIAGNOSIS — K57.30: ICD-10-CM

## 2018-05-31 DIAGNOSIS — E78.00: ICD-10-CM

## 2018-05-31 DIAGNOSIS — D50.9: ICD-10-CM

## 2018-05-31 DIAGNOSIS — Z79.84: ICD-10-CM

## 2018-05-31 DIAGNOSIS — D12.3: Primary | ICD-10-CM

## 2018-05-31 DIAGNOSIS — E11.9: ICD-10-CM

## 2018-05-31 DIAGNOSIS — I25.2: ICD-10-CM

## 2018-05-31 DIAGNOSIS — I10: ICD-10-CM

## 2018-05-31 NOTE — OPERATIVE NOTE
DATE OF SURGERY:   05/31/2018



OPERATION: 

1. ESOPHAGOGASTRODUODENOSCOPY.

2. COLONOSCOPY with cold snare polypectomy x2. 



PREOPERATIVE DIAGNOSIS: Episodic melena and iron deficiency anemia. 



POSTOPERATIVE DIAGNOSES:

1. Normal upper endoscopy. 

2. Sigmoid diverticulosis. 

3. Transverse colon polyps x2, status post cold snare removal.



PROCEDURE: After informed consent was obtained from the patient, he was placed in the left lateral 
decubitus position in the endoscopy suite, sedated and monitored by the department of anesthesia. A 
well-lubricated [id number] gastroscope was placed in the posterior oropharynx and under direct 
visualization passed to the proximal esophagus. The endoscope was advanced through the proximal, 
mid, and distal esophagus. The GE junction, esophagus, gastric body, antrum, pylorus, duodenal bulb 
and sweep were unremarkable. No fresh or old blood or angiodysplastic lesions, ulcers, erosions, or 
mass lesions were seen. J-turn views of the proximal stomach were unremarkable. The endoscope was 
then straightened and retracted from the patient with no new findings noted. 



Digital rectal examination was unremarkable. A well-lubricated [id number] colonoscope was inserted into 
the rectum and advanced to the cecum. Preparation quality was good. The cecum, ileocecal valve, and 
appendiceal orifice were unremarkable. The ascending colon was unremarkable. In the proximal 
transverse colon there were 2 sessile polyps ranging in size from 3-5 mm removed with a cold snare 
with minimal blood loss noted at the sites. The polyps were retrieved. The remainder of the 
transverse colon, descending colon, sigmoid colon, and rectum were then inspected. There were 
moderate diverticular changes in the sigmoid colon. No fresh or old blood was seen throughout the 
length of the colon. Forward and J-turn views of the rectum and anorectum were unremarkable. The 
endoscope was straightened, the rectal ampulla deflated, and the endoscope was removed. 



RECOMMENDATIONS: I can see no obvious explanation for the patient's episodic melena as well as his 
iron deficiency anemia. I would recommend he undergo a small bowel capsule exam. I will have my 
office work on scheduling this. 



As always, thank you for allowing me to participate in the healthcare of your patients. CC: ATIF HIGH MD, FACP

Dr. Latha OLGUIN